# Patient Record
Sex: MALE | Race: BLACK OR AFRICAN AMERICAN
[De-identification: names, ages, dates, MRNs, and addresses within clinical notes are randomized per-mention and may not be internally consistent; named-entity substitution may affect disease eponyms.]

---

## 2018-01-03 ENCOUNTER — HOSPITAL ENCOUNTER (EMERGENCY)
Dept: HOSPITAL 61 - ER | Age: 18
Discharge: HOME | End: 2018-01-03
Payer: COMMERCIAL

## 2018-01-03 DIAGNOSIS — R11.10: Primary | ICD-10-CM

## 2018-01-03 DIAGNOSIS — K30: ICD-10-CM

## 2018-01-03 LAB — POC GLUCOSE: 74 MG/DL (ref 70–99)

## 2018-01-03 PROCEDURE — 82962 GLUCOSE BLOOD TEST: CPT

## 2018-01-03 PROCEDURE — 99283 EMERGENCY DEPT VISIT LOW MDM: CPT

## 2018-01-03 RX ADMIN — ONDANSETRON 1 MG: 4 TABLET, ORALLY DISINTEGRATING ORAL at 19:11

## 2019-01-09 ENCOUNTER — HOSPITAL ENCOUNTER (EMERGENCY)
Dept: HOSPITAL 61 - ER | Age: 19
Discharge: HOME | End: 2019-01-09
Payer: COMMERCIAL

## 2019-01-09 VITALS — BODY MASS INDEX: 32.14 KG/M2 | WEIGHT: 200 LBS | HEIGHT: 66 IN

## 2019-01-09 VITALS — DIASTOLIC BLOOD PRESSURE: 94 MMHG | SYSTOLIC BLOOD PRESSURE: 161 MMHG

## 2019-01-09 DIAGNOSIS — F90.9: ICD-10-CM

## 2019-01-09 DIAGNOSIS — R07.89: Primary | ICD-10-CM

## 2019-01-09 LAB
ALBUMIN SERPL-MCNC: 4.2 G/DL (ref 3.4–5)
ALBUMIN/GLOB SERPL: 1.2 {RATIO} (ref 1–1.7)
ALP SERPL-CCNC: 67 U/L (ref 46–116)
ALT SERPL-CCNC: 19 U/L (ref 16–63)
ANION GAP SERPL CALC-SCNC: 10 MMOL/L (ref 6–14)
AST SERPL-CCNC: 14 U/L (ref 15–37)
BASOPHILS # BLD AUTO: 0 X10^3/UL (ref 0–0.2)
BASOPHILS NFR BLD: 1 % (ref 0–3)
BILIRUB SERPL-MCNC: 0.7 MG/DL (ref 0.2–1)
BUN SERPL-MCNC: 22 MG/DL (ref 8–26)
BUN/CREAT SERPL: 18 (ref 6–20)
CALCIUM SERPL-MCNC: 9.3 MG/DL (ref 8.5–10.1)
CHLORIDE SERPL-SCNC: 101 MMOL/L (ref 98–107)
CO2 SERPL-SCNC: 26 MMOL/L (ref 21–32)
CREAT SERPL-MCNC: 1.2 MG/DL (ref 0.7–1.3)
EOSINOPHIL NFR BLD: 0.2 X10^3/UL (ref 0–0.7)
EOSINOPHIL NFR BLD: 3 % (ref 0–3)
ERYTHROCYTE [DISTWIDTH] IN BLOOD BY AUTOMATED COUNT: 13 % (ref 11.5–14.5)
GFR SERPLBLD BASED ON 1.73 SQ M-ARVRAT: 93.4 ML/MIN
GLOBULIN SER-MCNC: 3.5 G/DL (ref 2.2–3.8)
GLUCOSE SERPL-MCNC: 88 MG/DL (ref 70–99)
HCT VFR BLD CALC: 47.9 % (ref 39–53)
HGB BLD-MCNC: 16.5 G/DL (ref 13–17.5)
LYMPHOCYTES # BLD: 2.3 X10^3/UL (ref 1–4.8)
LYMPHOCYTES NFR BLD AUTO: 26 % (ref 24–48)
MCH RBC QN AUTO: 30 PG (ref 25–35)
MCHC RBC AUTO-ENTMCNC: 35 G/DL (ref 31–37)
MCV RBC AUTO: 88 FL (ref 79–100)
MONO #: 0.8 X10^3/UL (ref 0–1.1)
MONOCYTES NFR BLD: 9 % (ref 0–9)
NEUT #: 5.5 X10^3UL (ref 1.8–7.7)
NEUTROPHILS NFR BLD AUTO: 61 % (ref 31–73)
PLATELET # BLD AUTO: 169 X10^3/UL (ref 140–400)
POTASSIUM SERPL-SCNC: 3.6 MMOL/L (ref 3.5–5.1)
PROT SERPL-MCNC: 7.7 G/DL (ref 6.4–8.2)
RBC # BLD AUTO: 5.46 X10^6/UL (ref 4.3–5.7)
SODIUM SERPL-SCNC: 137 MMOL/L (ref 136–145)
WBC # BLD AUTO: 9 X10^3/UL (ref 4–11)

## 2019-01-09 PROCEDURE — 93005 ELECTROCARDIOGRAM TRACING: CPT

## 2019-01-09 PROCEDURE — 80053 COMPREHEN METABOLIC PANEL: CPT

## 2019-01-09 PROCEDURE — 85379 FIBRIN DEGRADATION QUANT: CPT

## 2019-01-09 PROCEDURE — 84484 ASSAY OF TROPONIN QUANT: CPT

## 2019-01-09 PROCEDURE — 85025 COMPLETE CBC W/AUTO DIFF WBC: CPT

## 2019-01-09 PROCEDURE — 71046 X-RAY EXAM CHEST 2 VIEWS: CPT

## 2019-01-09 PROCEDURE — 36415 COLL VENOUS BLD VENIPUNCTURE: CPT

## 2019-01-09 NOTE — PHYS DOC
Past Medical History


Past Medical History:  No Pertinent History


Additional Past Medical Histor:  ADHD


Past Surgical History:  No Surgical History


Additional Past Surgical Histo:  WISDOM TEETH


Alcohol Use:  None


Drug Use:  None





Adult General


Chief Complaint


Chief Complaint:  CHEST WALL PAIN





[facility]


HPI





18-year-old presents to ER for complaints of sudden onset of left-sided chest 

pain at 1600. Patient reports he was having sexual intercourse and had just 

reached climax when the pain started. Patient reports pain was constant denies 

radiation his neck, active, or extremities. Patient denies any shortness of air 

but reports he did have pain with deep breaths. Patient reports pain continued 

and he went to work and about an hour and a half into work with pain continuing 

he left work to come to the ER for evaluation. At this time patient reports 

pain has subsided and he is denying any complaints. Patient denies any over-the-

counter medications for pain. Patient states with ongoing pain he did have 

increased anxiety reporting past history of this as well. Patient denies any 

previous cardiac history. Patient denies any caffeinated products. Patient 

denies sexual enhancement medications. She reports he works out regularly 

denies work out today. Patient denies any recent illness.





Patient denies smoking, alcohol use, or illicit drugs. He denies any recent 

travel.





Patient reports his mom has history of DVT and PE although she has multiple 

comorbidities including smoking and diabetic.





Review of Systems


Review of Systems





Constitutional: Denies fever or chills []


Eyes: Denies change in visual acuity, redness, or eye pain []


HENT: Denies nasal congestion or sore throat []


Respiratory: Denies cough or shortness of breath []


Cardiovascular: No additional information not addressed in HPI []


GI: Denies abdominal pain, nausea, vomiting, bloody stools or diarrhea []


: Denies dysuria or hematuria []


Musculoskeletal: Denies back pain or joint pain []


Integument: Denies rash or skin lesions []


Neurologic: Denies headache, focal weakness or sensory changes []


Endocrine: Denies polyuria or polydipsia []





All other systems were reviewed and found to be within normal limits, except as 

documented in this note.





Allergies


Allergies





Allergies








Coded Allergies Type Severity Reaction Last Updated Verified


 


  No Known Drug Allergies    1/3/18 No











Physical Exam


Physical Exam





Constitutional: Well developed, well nourished, no acute distress, non-toxic 

appearance. []


HENT: Normocephalic, atraumatic, bilateral external ears normal, oropharynx 

moist, no oral exudates, nose normal. []


Eyes: PERRLA, EOMI, conjunctiva normal, no discharge. [] 


Neck: Normal range of motion, no tenderness, supple, no stridor. [] 


Cardiovascular:Heart rate regular rhythm, no murmur []


Lungs & Thorax:  Bilateral breath sounds clear to auscultation []


Abdomen: Bowel sounds normal, soft, no tenderness, no masses, no pulsatile 

masses. [] 


Skin: Warm, dry, no erythema, no rash. [] 


Back: No tenderness, no CVA tenderness. [] 


Extremities: No tenderness, no cyanosis, no clubbing, ROM intact, no edema. [] 


Neurologic: Alert and oriented X 3, normal motor function, normal sensory 

function, no focal deficits noted. []


Psychologic: Affect normal, judgement normal, mood normal. []





Current Patient Data


Vital Signs





 Vital Signs








  Date Time  Temp Pulse Resp B/P (MAP) Pulse Ox O2 Delivery O2 Flow Rate FiO2


 


1/9/19 19:20 98.4 107 18 161/94 (116) 100 Room Air  





 98.4       








Lab Values





 Laboratory Tests








Test


 1/9/19


19:23


 


White Blood Count


 9.0 x10^3/uL


(4.0-11.0)


 


Red Blood Count


 5.46 x10^6/uL


(4.30-5.70)


 


Hemoglobin


 16.5 g/dL


(13.0-17.5)


 


Hematocrit


 47.9 %


(39.0-53.0)


 


Mean Corpuscular Volume


 88 fL ()





 


Mean Corpuscular Hemoglobin 30 pg (25-35)  


 


Mean Corpuscular Hemoglobin


Concent 35 g/dL


(31-37)


 


Red Cell Distribution Width


 13.0 %


(11.5-14.5)


 


Platelet Count


 169 x10^3/uL


(140-400)


 


Neutrophils (%) (Auto) 61 % (31-73)  


 


Lymphocytes (%) (Auto) 26 % (24-48)  


 


Monocytes (%) (Auto) 9 % (0-9)  


 


Eosinophils (%) (Auto) 3 % (0-3)  


 


Basophils (%) (Auto) 1 % (0-3)  


 


Neutrophils # (Auto)


 5.5 x10^3uL


(1.8-7.7)


 


Lymphocytes # (Auto)


 2.3 x10^3/uL


(1.0-4.8)


 


Monocytes # (Auto)


 0.8 x10^3/uL


(0.0-1.1)


 


Eosinophils # (Auto)


 0.2 x10^3/uL


(0.0-0.7)


 


Basophils # (Auto)


 0.0 x10^3/uL


(0.0-0.2)


 


D-Dimer (Bernadette)


 < 0.27


ug/mlFEU


 


Sodium Level


 137 mmol/L


(136-145)


 


Potassium Level


 3.6 mmol/L


(3.5-5.1)


 


Chloride Level


 101 mmol/L


()


 


Carbon Dioxide Level


 26 mmol/L


(21-32)


 


Anion Gap 10 (6-14)  


 


Blood Urea Nitrogen


 22 mg/dL


(8-26)


 


Creatinine


 1.2 mg/dL


(0.7-1.3)


 


Estimated GFR


(Cockcroft-Gault) 93.4  





 


BUN/Creatinine Ratio 18 (6-20)  


 


Glucose Level


 88 mg/dL


(70-99)


 


Calcium Level


 9.3 mg/dL


(8.5-10.1)


 


Total Bilirubin


 0.7 mg/dL


(0.2-1.0)


 


Aspartate Amino Transferase


(AST) 14 U/L (15-37)


L


 


Alanine Aminotransferase (ALT)


 19 U/L (16-63)





 


Alkaline Phosphatase


 67 U/L


()


 


Troponin I Quantitative


 < 0.017 ng/mL


(0.000-0.055)


 


Total Protein


 7.7 g/dL


(6.4-8.2)


 


Albumin


 4.2 g/dL


(3.4-5.0)


 


Albumin/Globulin Ratio 1.2 (1.0-1.7)  





 Laboratory Tests


1/9/19 19:23








 Laboratory Tests


1/9/19 19:23














EKG


EKG


EKG obtained 01/09/19 at 1915


Interpreted by Dr. Crenshaw





Sinus Tachycardia


Rate 105


No STEMI





Radiology/Procedures


Radiology/Procedures


PROCEDURE: CHEST PA & LATERAL





PA and lateral chest.


 


HISTORY: Midsternal chest pain hypertension


 


PA and lateral views of the chest show the lungs are clear. Heart is 


normal in size without heart failure. There is no effusion.


 


IMPRESSION:


1. No acute chest disease.


 


Electronically signed by: Fabian Ye MD (1/10/2019 12:44 AM) [facility]














DICTATED and SIGNED BY:     FABIAN YE MD


DATE:     01/10/19 0043





Course & Med Decision Making


Course & Med Decision Making


Pertinent Labs and Imaging studies reviewed. (See chart for details)





2040: Patient was evaluated in the ER for complaints of sudden onset of left-

sided chest pain during sexual intercourse the time of climax. Upon arriving to 

ER patient was pain-free and had no episodes of chest pain while in the ER. 

Discussed test results with patient with labs unremarkable and EKG with no 

acute ST elevation or STEMI and troponin was <0.017; d-dimer 0.27. Chest x-ray 

was reviewed by Dr. Crenshaw with no obvious findings for acute process. Pt at this 

time remains nontoxic in appearance denying CP/SOA. He reports he does feel 

anxious still denies palpitations. His HR is 86. Resp. equal/nonlabored. 

Discussed f/u with PCP in next 2-3 days- sooner with concerns. Education 

provided on s&s to return to ER for and discharge instructions were discussed.





Dragon Disclaimer


Dragon Disclaimer


This electronic medical record was generated, in whole or in part, using a 

voice recognition dictation system.





Departure


Departure


Impression:  


 Primary Impression:  


 Chest pain


Disposition:  01 HOME, SELF-CARE


Condition:  STABLE


Referrals:  


RANDY BETTENCOURT MD (PCP)


Patient Instructions:  Chest Pain (Nonspecific)





Additional Instructions:  


Follow-up with primary doctor in 2-3 days for re-evaluation- sooner with any 

concerns.





Tylenol and/or ibuprofen as directed on container- as needed for pain.











RODNEY MARVIN Jan 9, 2019 20:31

## 2019-01-10 NOTE — RAD
PA and lateral chest.

 

HISTORY: Midsternal chest pain hypertension

 

PA and lateral views of the chest show the lungs are clear. Heart is 

normal in size without heart failure. There is no effusion.

 

IMPRESSION:

1. No acute chest disease.

 

Electronically signed by: Fabian Ye MD (1/10/2019 12:44 AM) [facility]

## 2019-01-10 NOTE — EKG
[facility]

              [address] [place] [address]

Test Date:    2019               Test Time:    19:15:40

Pat Name:     EMELI BECKER            Department:   

Patient ID:   [id number]           Room:          

Gender:       M                        Technician:   

:          2000               Requested By: RODNEY MARVIN

Order Number: [id number]           Reading MD:   Danyel Su

                                 Measurements

Intervals                              Axis          

Rate:         105                      P:            56

MN:           152                      QRS:          76

QRSD:         92                       T:            36

QT:           316                                    

QTc:          421                                    

                           Interpretive Statements

SINUS TACHYCARDIA



Electronically Signed On 1- 9:25:35 CST by Danyel Su

## 2019-11-15 ENCOUNTER — HOSPITAL ENCOUNTER (EMERGENCY)
Dept: HOSPITAL 61 - ER | Age: 19
Discharge: HOME | End: 2019-11-15
Payer: COMMERCIAL

## 2019-11-15 VITALS — HEIGHT: 68 IN | WEIGHT: 200 LBS | BODY MASS INDEX: 30.31 KG/M2

## 2019-11-15 VITALS — DIASTOLIC BLOOD PRESSURE: 98 MMHG | SYSTOLIC BLOOD PRESSURE: 150 MMHG

## 2019-11-15 DIAGNOSIS — G43.909: Primary | ICD-10-CM

## 2019-11-15 DIAGNOSIS — F90.9: ICD-10-CM

## 2019-11-15 PROCEDURE — 99284 EMERGENCY DEPT VISIT MOD MDM: CPT

## 2019-11-15 PROCEDURE — 96372 THER/PROPH/DIAG INJ SC/IM: CPT

## 2019-11-15 NOTE — PHYS DOC
Past Medical History


Past Medical History:  Migraines


Additional Past Medical Histor:  ADHD


Past Surgical History:  No Surgical History


Additional Past Surgical Histo:  WISDOM TEETH


Alcohol Use:  None


Drug Use:  None





Adult General


Chief Complaint


Chief Complaint:  HEADACHE





HPI


HPI





Patient is a 19  year old male patient with history of migraine headaches who 

presents to the ED today complaining of a left-sided 9 out of 10 migraine 

headache with nausea no vomiting that began at 5:30 PM today. Patient reports he

took Aleve and ibuprofen with no relief. Denies anything specifically 

exacerbating or relieving his headache.





Review of Systems


Review of Systems





Constitutional: Denies fever or chills []


Eyes: Denies change in visual acuity, redness, or eye pain []


HENT: Denies nasal congestion or sore throat []


Respiratory: Denies cough or shortness of breath []


Cardiovascular: No additional information not addressed in HPI []


GI: Denies abdominal pain, nausea, vomiting, bloody stools or diarrhea []


: Denies dysuria or hematuria []


Musculoskeletal: Denies back pain or joint pain []


Integument: Denies rash or skin lesions []


Neurologic: Reports migraine headache, denies focal weakness or sensory changes 

[]








All other systems were reviewed and found to be within normal limits, except as 

documented in this note.





Current Medications


Current Medications





Current Medications








 Medications


  (Trade)  Dose


 Ordered  Sig/Dread  Start Time


 Stop Time Status Last Admin


Dose Admin


 


 Diphenhydramine


 HCl


  (Benadryl)  25 mg  1X  ONCE  11/15/19 21:30


 11/15/19 21:31 DC  





 


 Methylprednisolone


 Sodium Succinate


  (SOLU-Medrol


 125MG VIAL)  125 mg  1X  ONCE  11/15/19 21:30


 11/15/19 21:31 DC  





 


 Prochlorperazine


 Edisylate


  (Compazine)  10 mg  1X  ONCE  11/15/19 21:30


 11/15/19 21:31 DC  














Allergies


Allergies





Allergies








Coded Allergies Type Severity Reaction Last Updated Verified


 


  No Known Drug Allergies    1/3/18 No











Physical Exam


Physical Exam





Constitutional: Well developed, well nourished, no acute distress, non-toxic 

appearance. []


HENT: Normocephalic, atraumatic, bilateral external ears normal, oropharynx 

moist, no oral exudates, nose normal. []


Eyes: PERRLA, EOMI, conjunctiva normal, no discharge. [] 


Neck: Normal range of motion, no tenderness, supple, no stridor. [] 


Cardiovascular:Heart rate regular rhythm, no murmur []


Lungs & Thorax:  Bilateral breath sounds clear to auscultation []


Abdomen: Bowel sounds normal, soft, no tenderness, no masses, no pulsatile 

masses. [] 


Skin: Warm, dry, no erythema, no rash. [] 


Back: No tenderness, no CVA tenderness. [] 


Extremities: No tenderness, no cyanosis, no clubbing, ROM intact, no edema. [] 


Neurologic: Alert and oriented X 3, normal motor function, normal sensory 

function, no focal deficits noted. Cranial nerves II through XII intact


Psychologic: Affect normal, judgement normal, mood normal. []





Current Patient Data


Vital Signs





                                   Vital Signs








  Date Time  Temp Pulse Resp B/P (MAP) Pulse Ox O2 Delivery O2 Flow Rate FiO2


 


11/15/19 21:10 97.7 83 18 150/98 (115) 98 Room Air  





 97.7       











EKG


EKG


[]





Radiology/Procedures


Radiology/Procedures


[]





Course & Med Decision Making


Course & Med Decision Making


Pertinent Labs and Imaging studies reviewed. (See chart for details)





This is a 19-year-old male patient presented to the ED today with exacerbation 

of chronic migraine headaches. Patient has taken Aleve and ibuprofen. There is 

nothing unusual about his headache today. Was given Solu-Medrol, Compazine and 

Benadryl in the ED. Discharged with Imitrex and Compazine. Provided neurology 

for follow-up or he can follow-up with his own





Dragon Disclaimer


Dragon Disclaimer


This electronic medical record was generated, in whole or in part, using a voice

 recognition dictation system.





Departure


Departure


Impression:  


   Primary Impression:  


   Migraine headache


Disposition:  01 HOME, SELF-CARE


Condition:  STABLE


Referrals:  


RANDY BETTENCOURT MD (PCP)


follow up next week





JAQUAN SEVERINO MD


follow up next week


Patient Instructions:  Migraine Headache





Additional Instructions:  


You were evaluated in the emergency room for a migraine headache. Take the 

prescribed medications as ordered. Follow-up with your own doctor in the course 

of next week or the provided neurologist.


Scripts


Prochlorperazine Maleate (Compazine) 10 Mg Tablet


1 TAB PO Q6HRS for 7 Days, #15 TAB 0 Refills


   Prov: LOUISA JONES         11/15/19 


Sumatriptan Succinate (IMITREX) 50 Mg Tablet


1 TAB PO UD, #9 TAB 1 Refill


   Prov: LOUISA JONES GUNNER         11/15/19





Problem Qualifiers








   Primary Impression:  


   Migraine headache


   Migraine type:  unspecified  Status migrainosus presence:  without status 

   migrainosus  Intractability:  not intractable  Qualified Codes:  G43.909 - 

   Migraine, unspecified, not intractable, without status migrainosus








LOUISA JONES GUNNER              Nov 15, 2019 22:05

## 2020-03-06 ENCOUNTER — HOSPITAL ENCOUNTER (EMERGENCY)
Dept: HOSPITAL 61 - ER | Age: 20
Discharge: HOME | End: 2020-03-06
Payer: COMMERCIAL

## 2020-03-06 VITALS — SYSTOLIC BLOOD PRESSURE: 141 MMHG | DIASTOLIC BLOOD PRESSURE: 88 MMHG

## 2020-03-06 VITALS — BODY MASS INDEX: 40.25 KG/M2 | WEIGHT: 250.45 LBS | HEIGHT: 66 IN

## 2020-03-06 DIAGNOSIS — S30.810A: Primary | ICD-10-CM

## 2020-03-06 DIAGNOSIS — G43.909: ICD-10-CM
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
## 2020-03-06 PROCEDURE — 99283 EMERGENCY DEPT VISIT LOW MDM: CPT

## 2020-03-06 PROCEDURE — 90715 TDAP VACCINE 7 YRS/> IM: CPT

## 2020-03-06 PROCEDURE — 90471 IMMUNIZATION ADMIN: CPT

## 2020-03-06 NOTE — PHYS DOC
Past Medical History


Past Medical History:  No Pertinent History, Migraines


Additional Past Medical Histor:  ADHD


Past Surgical History:  No Surgical History


Additional Past Surgical Histo:  WISDOM TEETH


Smoking Status:  Never Smoker


Alcohol Use:  None


Drug Use:  None





Adult General


Chief Complaint


Chief Complaint:  ANIMAL BITE





[facility]


HPI





Patient is a 19  year old male, accompanied by his significant other, who 

presents to the emergency department with complaints of a dog bite to his left 

buttock.  Patient states that a dog that is not familiar to him bit him on the 

left buttock today.  He denies any numbness, or tingling of the area.  He 

currently rates his pain a 4 out of 10 on the pain scale, he denies any 

alleviating factors, the pain increases if he tries to sit.  The patient is 

unsure of when his last tetanus shot was.





Review of Systems


Review of Systems


Complete ROS is negative unless otherwise noted in HPI.





Allergies


Allergies





Allergies








Coded Allergies Type Severity Reaction Last Updated Verified


 


  No Known Drug Allergies    1/3/18 No











Physical Exam


Physical Exam


See Above


Constitutional: Well developed, well nourished, no acute distress, non-toxic 

appearance. []


HENT: Normocephalic, atraumatic, bilateral external ears normal, oropharynx 

moist, no oral exudates, nose normal. []


Eyes: PERRLA, EOMI, conjunctiva injected bilaterally, no discharge. [] 


Neck: Normal range of motion, no stridor. [] 


Cardiovascular:Heart rate regular rhythm, no murmur []


Lungs & Thorax:  Bilateral breath sounds clear to auscultation, Respirations e

elma and unlabored, no retractions, no respiratory distress


Skin: Warm, dry, no erythema, no rash; 5 cm diameter calvin of puncture sites with

 abrasions noted to  medial left buttock, no active bleeding or surrounding 

erythema, superficial appearance


Extremities: No cyanosis, ROM intact


Neurologic: Alert and oriented X 3, no focal deficits noted. []


Psychologic: Affect normal, judgement normal, mood normal. []





Current Patient Data


Vital Signs





                                   Vital Signs








  Date Time  Temp Pulse Resp B/P (MAP) Pulse Ox O2 Delivery O2 Flow Rate FiO2


 


3/6/20 18:23 97.7 97 16 141/88 (105) 98 Room Air  





 97.7       











EKG


EKG


[]





Radiology/Procedures


Radiology/Procedures


[]





Course & Med Decision Making


Course & Med Decision Making


Pertinent Labs and Imaging studies reviewed. (See chart for details)





[]





Dragon Disclaimer


Dragon Disclaimer


This electronic medical record was generated, in whole or in part, using a voice

 recognition dictation system.





Departure


Departure


Impression:  


   Primary Impression:  


   Dog bite of buttock


   Additional Impression:  


   Need for Tdap vaccination


Disposition:  01 HOME, SELF-CARE


Condition:  STABLE


Referrals:  


RANDY BETTENCOURT MD (PCP)


Patient Instructions:  Animal Bite, Easy-to-Read





Additional Instructions:  


Fill the prescriptions and use them as directed.  Cleanse the area and apply new

 dressing at least twice daily, follow-up with your primary care doctor on 

Monday for reevaluation, return to the ER sooner if you develop a fever, or 

increased redness, warmth, or drainage at the site.


Scripts


Ibuprofen (IBUPROFEN) 800 Mg Tablet


800 MG PO PRN Q6HRS PRN for PAIN for 5 Days, #20 TAB 0 Refills


   Prov: RITA PEREZ APRN         3/6/20 


Bacitracin/Polymyxin B Sulfate (POLYSPORIN TOPICAL OINT) 28.3 Gm Oint...g.


1 TRES TP BID for WOUND CARE for 7 Days, #1 TUBE 0 Refills


   AS DIRECTED BY PHYSICIAN


   Prov: RITA PEREZ APRN         3/6/20 


Amoxicillin/Potassium Clav (AUGMENTIN 875-125 TABLET) 1 Each Tablet


1 TAB PO BID for 7 Days, #14 TAB 0 Refills


   Prov: RITA PEREZ         3/6/20





Problem Qualifiers








   Primary Impression:  


   Dog bite of buttock


   Encounter type:  initial encounter  Laterality:  left  Qualified Codes:  

   S31.825A - Open bite of left buttock, initial encounter; W54.0XXA - Bitten by

    dog, initial encounter








RITA PEREZ APRN        Mar 6, 2020 18:40

## 2020-04-25 ENCOUNTER — HOSPITAL ENCOUNTER (EMERGENCY)
Dept: HOSPITAL 61 - ER | Age: 20
Discharge: HOME | End: 2020-04-25
Payer: COMMERCIAL

## 2020-04-25 VITALS — SYSTOLIC BLOOD PRESSURE: 139 MMHG | DIASTOLIC BLOOD PRESSURE: 80 MMHG

## 2020-04-25 VITALS — HEIGHT: 66 IN | WEIGHT: 245.59 LBS | BODY MASS INDEX: 39.47 KG/M2

## 2020-04-25 DIAGNOSIS — G43.001: Primary | ICD-10-CM

## 2020-04-25 PROCEDURE — 96375 TX/PRO/DX INJ NEW DRUG ADDON: CPT

## 2020-04-25 PROCEDURE — 96374 THER/PROPH/DIAG INJ IV PUSH: CPT

## 2020-04-25 PROCEDURE — 99284 EMERGENCY DEPT VISIT MOD MDM: CPT

## 2020-04-25 NOTE — PHYS DOC
Past Medical History


Past Medical History:  Migraines


Additional Past Medical Histor:  ADHD


Past Surgical History:  No Surgical History


Additional Past Surgical Histo:  WISDOM TEETH


Smoking Status:  Never Smoker


Alcohol Use:  None


Drug Use:  None





General Adult


EDM:


Chief Complaint:  HEADACHE





HPI:


HPI:





Patient is a 20-year-old male with a history of migraine headaches who presents 

with a 3-day history of what he describes as his typical migraine.  He describes

both photophobia and phonophobia.  He has had some nausea with the pain.  He 

denies any fever or neck pain.  He denies any lateralizing neurologic weakness. 

He is taken some over-the-counter Tylenol at home without relief.  The pain is 

global and throbbing in nature.  []





Review of Systems:


Review of Systems:


Constitutional:  Denies fever or chills. []


Eyes:  Denies change in visual acuity. []


HENT:  Denies nasal congestion or sore throat. [] 


Respiratory:  Denies cough or shortness of breath. [] 


Cardiovascular:  Denies chest pain or edema. [] 


GI:  Denies abdominal pain, nausea, vomiting, bloody stools or diarrhea. [] 


:  Denies dysuria. [] 


Musculoskeletal:  Denies back pain or joint pain. [] 


Integument:  Denies rash. [] 


Neurologic: Reports headache [] 


Endocrine:  Denies polyuria or polydipsia. [] 


Lymphatic:  Denies swollen glands. [] 


Psychiatric:  Denies depression or anxiety. []





Heart Score:


Risk Factors:


Risk Factors:  DM, Current or recent (<one month) smoker, HTN, HLP, family 

history of CAD, obesity.


Risk Scores:


Score 0 - 3:  2.5% MACE over next 6 weeks - Discharge Home


Score 4 - 6:  20.3% MACE over next 6 weeks - Admit for Clinical Observation


Score 7 - 10:  72.7% MACE over next 6 weeks - Early Invasive Strategies





Current Medications:





Current Medications








 Medications


  (Trade)  Dose


 Ordered  Sig/Dread  Start Time


 Stop Time Status Last Admin


Dose Admin


 


 Diphenhydramine


 HCl


  (Benadryl)  25 mg  1X  ONCE  4/25/20 16:45


 4/25/20 16:46 UNV  





 


 Ketorolac


 Tromethamine


  (Toradol 30mg


 Vial)  30 mg  1X  ONCE  4/25/20 16:45


 4/25/20 16:46 UNV  





 


 Metoclopramide HCl


  (Reglan Vial)  10 mg  1X  ONCE  4/25/20 16:45


 4/25/20 16:46 UNV  





 


 Sodium Chloride  1,000 ml @ 


 1,000 mls/hr  1X  ONCE  4/25/20 16:45


 4/25/20 17:44 UNV  














Allergies:


Allergies:





Allergies








Coded Allergies Type Severity Reaction Last Updated Verified


 


  No Known Drug Allergies    1/3/18 No











Physical Exam:


PE:





Constitutional: Well developed, well nourished, appears uncomfortable, non-toxic

 appearance. []


HENT: Normocephalic, atraumatic, bilateral external ears normal, oropharynx 

moist, no oral exudates, nose normal. []


Eyes: PERRLA, EOMI, conjunctiva normal, no discharge. [] 


Neck: Normal range of motion, no tenderness, supple, no stridor. [] 


Cardiovascular:Heart rate regular rhythm, no murmur []


Lungs & Thorax:  Bilateral breath sounds clear to auscultation []


Abdomen: Bowel sounds normal, soft, no tenderness, no masses, no pulsatile 

masses. [] 


Skin: Warm, dry, no erythema, no rash. [] 


Back: No tenderness, no CVA tenderness. [] 


Extremities: No tenderness, no cyanosis, no clubbing, ROM intact, no edema. [] 


Neurologic: Alert and oriented X 3, normal motor function, normal sensory 

function, no focal deficits noted. []


Psychologic: Anxious l. []





EKG:


EKG:


[]





Radiology/Procedures:


Radiology/Procedures:


[]





Course & Med Decision Making:


Course & Med Decision Making


Pertinent Labs and Imaging studies reviewed. (See chart for details)





[ED course: Evaluation reveals a 20-year-old male with migraine headache.  He 

was given 1 L normal saline, Toradol 30 mg IV, Reglan 10 mg IV and Benadryl 25 

mg IV with complete resolution of his symptoms.]





Dragon Disclaimer:


Dragon Disclaimer:


This electronic medical record was generated, in whole or in part, using a voice

recognition dictation system.





Departure


Departure


Impression:  


   Primary Impression:  


   Migraine headache


   Qualified Codes:  G43.001 - Migraine without aura, not intractable, with 

   status migrainosus


Disposition:  01 HOME, SELF-CARE


Condition:  IMPROVED


Referrals:  


RANDY BETTENCOURT MD (PCP)


Patient Instructions:  Migraine Headache


Scripts


Metoclopramide Hcl (REGLAN) 10 Mg Tablet


1 TAB PO Q8HRS PRN for migraine, #30 TAB


   Take 25 mg of Benadryl with each dose


   Prov: DENIZ SINGH DO         4/25/20











DENIZ SINGH DO             Apr 25, 2020 16:53

## 2020-06-13 ENCOUNTER — HOSPITAL ENCOUNTER (EMERGENCY)
Dept: HOSPITAL 61 - ER | Age: 20
Discharge: HOME | End: 2020-06-13
Payer: COMMERCIAL

## 2020-06-13 VITALS — HEIGHT: 66 IN | WEIGHT: 250.45 LBS | BODY MASS INDEX: 40.25 KG/M2

## 2020-06-13 VITALS — SYSTOLIC BLOOD PRESSURE: 136 MMHG | DIASTOLIC BLOOD PRESSURE: 84 MMHG

## 2020-06-13 DIAGNOSIS — R00.0: ICD-10-CM

## 2020-06-13 DIAGNOSIS — G43.909: ICD-10-CM

## 2020-06-13 DIAGNOSIS — R07.2: Primary | ICD-10-CM

## 2020-06-13 LAB
ALBUMIN SERPL-MCNC: 4 G/DL (ref 3.4–5)
ALBUMIN/GLOB SERPL: 1.3 {RATIO} (ref 1–1.7)
ALP SERPL-CCNC: 65 U/L (ref 46–116)
ALT SERPL-CCNC: 43 U/L (ref 16–63)
ANION GAP SERPL CALC-SCNC: 9 MMOL/L (ref 6–14)
AST SERPL-CCNC: 23 U/L (ref 15–37)
BASOPHILS # BLD AUTO: 0.1 X10^3/UL (ref 0–0.2)
BASOPHILS NFR BLD: 1 % (ref 0–3)
BILIRUB SERPL-MCNC: 0.6 MG/DL (ref 0.2–1)
BUN SERPL-MCNC: 23 MG/DL (ref 8–26)
BUN/CREAT SERPL: 19 (ref 6–20)
CALCIUM SERPL-MCNC: 8.6 MG/DL (ref 8.5–10.1)
CHLORIDE SERPL-SCNC: 102 MMOL/L (ref 98–107)
CO2 SERPL-SCNC: 27 MMOL/L (ref 21–32)
CREAT SERPL-MCNC: 1.2 MG/DL (ref 0.7–1.3)
EOSINOPHIL NFR BLD: 0.1 X10^3/UL (ref 0–0.7)
EOSINOPHIL NFR BLD: 1 % (ref 0–3)
ERYTHROCYTE [DISTWIDTH] IN BLOOD BY AUTOMATED COUNT: 13.5 % (ref 11.5–14.5)
GFR SERPLBLD BASED ON 1.73 SQ M-ARVRAT: 75.7 ML/MIN
GLOBULIN SER-MCNC: 3 G/DL (ref 2.2–3.8)
GLUCOSE SERPL-MCNC: 102 MG/DL (ref 70–99)
HCT VFR BLD CALC: 44.4 % (ref 39–53)
HGB BLD-MCNC: 14.9 G/DL (ref 13–17.5)
LYMPHOCYTES # BLD: 1.9 X10^3/UL (ref 1–4.8)
LYMPHOCYTES NFR BLD AUTO: 16 % (ref 24–48)
MAGNESIUM SERPL-MCNC: 1.9 MG/DL (ref 1.8–2.4)
MCH RBC QN AUTO: 29 PG (ref 25–35)
MCHC RBC AUTO-ENTMCNC: 34 G/DL (ref 31–37)
MCV RBC AUTO: 88 FL (ref 79–100)
MONO #: 1.1 X10^3/UL (ref 0–1.1)
MONOCYTES NFR BLD: 9 % (ref 0–9)
NEUT #: 8.9 X10^3/UL (ref 1.8–7.7)
NEUTROPHILS NFR BLD AUTO: 74 % (ref 31–73)
PLATELET # BLD AUTO: 183 X10^3/UL (ref 140–400)
POTASSIUM SERPL-SCNC: 3.4 MMOL/L (ref 3.5–5.1)
PROT SERPL-MCNC: 7 G/DL (ref 6.4–8.2)
RBC # BLD AUTO: 5.04 X10^6/UL (ref 4.3–5.7)
SODIUM SERPL-SCNC: 138 MMOL/L (ref 136–145)
WBC # BLD AUTO: 12 X10^3/UL (ref 4–11)

## 2020-06-13 PROCEDURE — 85025 COMPLETE CBC W/AUTO DIFF WBC: CPT

## 2020-06-13 PROCEDURE — 80053 COMPREHEN METABOLIC PANEL: CPT

## 2020-06-13 PROCEDURE — 83880 ASSAY OF NATRIURETIC PEPTIDE: CPT

## 2020-06-13 PROCEDURE — 84484 ASSAY OF TROPONIN QUANT: CPT

## 2020-06-13 PROCEDURE — 85379 FIBRIN DEGRADATION QUANT: CPT

## 2020-06-13 PROCEDURE — 83735 ASSAY OF MAGNESIUM: CPT

## 2020-06-13 PROCEDURE — 36415 COLL VENOUS BLD VENIPUNCTURE: CPT

## 2020-06-13 PROCEDURE — 93005 ELECTROCARDIOGRAM TRACING: CPT

## 2020-06-13 PROCEDURE — 99284 EMERGENCY DEPT VISIT MOD MDM: CPT

## 2020-06-13 NOTE — PHYS DOC
Past Medical History


Past Medical History:  Migraines


Additional Past Medical Histor:  ADHD


Past Surgical History:  Other


Additional Past Surgical Histo:  WISDOM TEETH


Smoking Status:  Never Smoker


Alcohol Use:  Rarely


Drug Use:  None





General Adult


EDM:


Chief Complaint:  CHEST PAIN





HPI:


HPI:





Patient is a 20  year old male who presents with complaint of midsternal chest 

discomfort that started about a week ago and has been intermittent since onset. 

Patient states that pain typically lasts 10 to 15 minutes but has lasted as long

as 30 minutes.  He states that at its worst pain is been a 7 out of 10 and is 

currently about a 3 out of 10.  He denies any nausea, vomiting or diaphoresis 

associated with the pain.  He denies any exacerbating or alleviating factors.  

He describes pain as a dull ache.  []





Review of Systems:


Review of Systems:


Constitutional:  Denies fever or chills. []


Respiratory:  Denies cough or shortness of breath. [] 


Cardiovascular: Complains of chest pain. [] 


GI:  Denies abdominal pain, nausea, vomiting, bloody stools or diarrhea. [] 


Integument:  Denies rash. [] 


Neurologic:  Denies headache, focal weakness or sensory changes. [] 





A full 10 point review of systems has been reviewed and is otherwise negative.





Heart Score:


Risk Factors:


Risk Factors:  DM, Current or recent (<one month) smoker, HTN, HLP, family 

history of CAD, obesity.


Risk Scores:


Score 0 - 3:  2.5% MACE over next 6 weeks - Discharge Home


Score 4 - 6:  20.3% MACE over next 6 weeks - Admit for Clinical Observation


Score 7 - 10:  72.7% MACE over next 6 weeks - Early Invasive Strategies





Allergies:


Allergies:





Allergies








Coded Allergies Type Severity Reaction Last Updated Verified


 


  No Known Drug Allergies    1/3/18 No











Physical Exam:


PE:





Constitutional: Well developed, well nourished, no acute distress, non-toxic 

appearance. []


HENT: Normocephalic, atraumatic, bilateral external ears normal, oropharynx 

moist, no oral exudates, nose normal. []


Eyes: PERRLA, EOMI, conjunctiva normal, no discharge. [] 


Neck: Normal range of motion, no tenderness, supple, no stridor. [] 


Cardiovascular: Regular rate and rhythm []


Lungs & Thorax:  Bilateral breath sounds clear to auscultation []


Abdomen: Bowel sounds normal, soft, no tenderness. [] 


Skin: Warm, dry, no erythema, no rash. [] 


Extremities: No tenderness, no cyanosis, no clubbing, ROM intact. [] 


Neurologic: Alert and oriented X 3, no focal deficits noted. []





Current Patient Data:


Vital Signs:





                                   Vital Signs








  Date Time  Temp Pulse Resp B/P (MAP) Pulse Ox O2 Delivery O2 Flow Rate FiO2


 


6/13/20 03:25 98.3 97 14 136/84 (101) 99 Room Air  





 98.3       











EKG:


EKG:


EKG demonstrates sinus tachycardia with a rate of 101.  []





Radiology/Procedures:


Radiology/Procedures:


A chest x-ray has been ordered on this patient however patient has refused chest

 x-ray.  []





Course & Med Decision Making:


Course & Med Decision Making


Pertinent Labs and Imaging studies reviewed. (See chart for details)





[]





Dragon Disclaimer:


Dragon Disclaimer:


This electronic medical record was generated, in whole or in part, using a voice

 recognition dictation system.





Departure


Departure


Impression:  


   Primary Impression:  


   Chest pain


   Qualified Codes:  R07.9 - Chest pain, unspecified


Disposition:  01 HOME, SELF-CARE


Condition:  STABLE


Referrals:  


RANDY BETTENCOURT MD (PCP)


Patient Instructions:  Chest Pain (Nonspecific)





Justicifation of Admission Dx:


Justifications for Admission:


Justification of Admission Dx:  N/A











CAITLIN VAZQUEZ Jr. DO          Jun 13, 2020 03:52

## 2020-06-14 NOTE — EKG
[facility]

              [address] [place] [address]

Test Date:    2020               Test Time:    02:58:30

Pat Name:     EMELI BECKER            Department:   

Patient ID:   [id number]           Room:          

Gender:       M                        Technician:   

:          2000               Requested By: CAITLIN VAZQUEZ

Order Number: [id number]           Reading MD:   Parveen Higuera MD

                                 Measurements

Intervals                              Axis          

Rate:         101                      P:            45

DC:           170                      QRS:          64

QRSD:         88                       T:            36

QT:           332                                    

QTc:          431                                    

                           Interpretive Statements

SINUS TACHYCARDIA



Electronically Signed On 6- 13:17:59 CDT by Parveen Higuera MD

## 2022-05-09 ENCOUNTER — HOSPITAL ENCOUNTER (EMERGENCY)
Dept: HOSPITAL 61 - ER | Age: 22
Discharge: HOME | End: 2022-05-09
Payer: SELF-PAY

## 2022-05-09 VITALS — HEIGHT: 65 IN | BODY MASS INDEX: 46.02 KG/M2 | WEIGHT: 276.24 LBS

## 2022-05-09 VITALS — DIASTOLIC BLOOD PRESSURE: 92 MMHG | SYSTOLIC BLOOD PRESSURE: 135 MMHG

## 2022-05-09 DIAGNOSIS — Z20.822: ICD-10-CM

## 2022-05-09 DIAGNOSIS — B34.9: Primary | ICD-10-CM

## 2022-05-09 DIAGNOSIS — G43.909: ICD-10-CM

## 2022-05-09 DIAGNOSIS — M79.10: ICD-10-CM

## 2022-05-09 LAB
INFLUENZA A PATIENT: NEGATIVE
INFLUENZA B PATIENT: NEGATIVE

## 2022-05-09 PROCEDURE — 87428 SARSCOV & INF VIR A&B AG IA: CPT

## 2022-05-09 PROCEDURE — 99283 EMERGENCY DEPT VISIT LOW MDM: CPT

## 2022-05-09 NOTE — PHYS DOC
Past Medical History


Past Medical History:  Migraines


Additional Past Medical Histor:  ADHD


Past Surgical History:  Other


Additional Past Surgical Histo:  WISDOM TEETH


Smoking Status:  Never Smoker


Alcohol Use:  Rarely


Drug Use:  None





Adult General


Chief Complaint


Chief Complaint:  FLU SYMPTOM





HPI


HPI





Patient is a 22  year old male presenting to the emergency department for 

evaluation of chills arthralgias and myalgias that started today.  Reportedly he

was exposed to several individuals that tested positive for COVID and he was 

told by his work that he had to come here and have COVID testing done before he 

could go back to work.  Patient admits that he was working outside today in the 

heat and he may not have been drinking enough fluids.  He says that  he thinks 

his urine may be slightly darker than usual.  He did not have a measured fever. 

He says he has some congestion and a mild cough but does not feel short of 

breath.  He denies headache neck stiffness rash dysuria hematuria diarrhea.  He 

says he has no medical problems and takes no medications on a regular basis.  He

says that he is thirsty and would appreciate something to drink so he was given 

3 cups of ice water to drink.  I told him that it is possible that he could have

rhabdomyolysis from the sweating and not taking in enough fluids but he said he 

did not want any blood test done rather he said he wants to drink water and 

rehydrate himself that way and that he is just here to get the COVID testing 

done.





Review of Systems


Review of Systems





Constitutional: Denies fever or chills []


Eyes: Denies change in visual acuity, redness, or eye pain []


HENT: + nasal congestion.  No sore throat []


Respiratory: + cough.  No shortness of breath []


Cardiovascular: No additional information not addressed in HPI []


GI: Denies abdominal pain, nausea, vomiting, bloody stools or diarrhea []


:  Denies dysuria or hematuria []


Musculoskeletal: Denies back pain or joint pain []


Integument: Denies rash or skin lesions []


Neurologic: Denies headache, focal weakness or sensory changes []





All other systems were reviewed and found to be within normal limits, except as 

documented in this note.





Allergies


Allergies





Allergies








Coded Allergies Type Severity Reaction Last Updated Verified


 


  No Known Drug Allergies    1/3/18 No











Physical Exam


Physical Exam





Constitutional: Well developed, well nourished, no acute distress, non-toxic 

appearance. []


HENT: Normocephalic, atraumatic, bilateral external ears normal, oropharynx 

moist, no oral exudates, nose normal. []


Eyes: PERRLA, EOMI, conjunctiva normal, no discharge. [] 


Neck: Normal range of motion, no tenderness, supple, no stridor. [] 


Cardiovascular:Heart rate regular rhythm, no murmur []


Lungs & Thorax:  Bilateral breath sounds clear to auscultation []


Abdomen: Bowel sounds normal, soft, no tenderness, no masses, no pulsatile 

masses. [] 


Skin: Warm, dry, no erythema, no rash. [] 


Back: No tenderness, no CVA tenderness. [] 


Extremities: No tenderness, no cyanosis, no clubbing, ROM intact, no edema. [] 


Neurologic: Alert and oriented X 3, normal motor function, normal sensory 

function, no focal deficits noted. []





Current Patient Data


Vital Signs





                                   Vital Signs








  Date Time  Temp Pulse Resp B/P (MAP) Pulse Ox O2 Delivery O2 Flow Rate FiO2


 


5/9/22 19:10 98.4 73 18 135/92 (106) 98 Room Air  





 98.4       








Lab Values





                                Laboratory Tests








Test


 5/9/22


19:30


 


Influenza Type A Antigen


 Negative


(NEGATIVE)


 


Influenza Type B Antigen


 Negative


(NEGATIVE)


 


SARS-CoV-2 Antigen (Rapid)


 Negative


(NEGATIVE)











EKG


EKG


[]





Radiology/Procedures


Radiology/Procedures


[]





Course & Med Decision Making


Course & Med Decision Making


Patient has normal vital signs and appears nontoxic and is not wanting blood 

testing done but he is drinking fluids aggressively.  I will check influenza and

 COVID swabs and reassess.





Patient's swabs are negative and he continues to have normal vital signs and 

says he feels better after drinking fluids I recommended continuing to drink 

aggressive fluids and taking Tylenol and ibuprofen for pain.  I told patient to 

follow with a primary care provider within 2 to 3 days for recheck and come back

 to emergency department sooner with worsening pain fevers body aches decreased 

urination or other general concerns.  Patient aware and agreeable with plan and 

verbalized understanding of the above instructions.





Dragon Disclaimer


Dragon Disclaimer


This electronic medical record was generated, in whole or in part, using a voice

 recognition dictation system.





Departure


Departure


Impression:  


   Primary Impression:  


   Viral syndrome


   Additional Impression:  


   Myalgia


Disposition:  01 HOME / SELF CARE / HOMELESS


Condition:  STABLE


Referrals:  


RANDY BETTENCOURT MD (PCP)


Patient Instructions:  Myalgia, Adult





Additional Instructions:  


Drink plenty of fluids and take Tylenol and ibuprofen for pain.  Follow with 

your primary care provider this week and come back to emergency department 

sooner with worsening pain fevers or other concerns.





Problem Qualifiers











SAIGE AGUSTIN DO              May 9, 2022 19:38